# Patient Record
Sex: FEMALE | Race: BLACK OR AFRICAN AMERICAN | NOT HISPANIC OR LATINO
[De-identification: names, ages, dates, MRNs, and addresses within clinical notes are randomized per-mention and may not be internally consistent; named-entity substitution may affect disease eponyms.]

---

## 2021-10-21 ENCOUNTER — DASHBOARD ENCOUNTERS (OUTPATIENT)
Age: 66
End: 2021-10-21

## 2021-10-21 ENCOUNTER — OFFICE VISIT (OUTPATIENT)
Dept: URBAN - NONMETROPOLITAN AREA CLINIC 2 | Facility: CLINIC | Age: 66
End: 2021-10-21
Payer: MEDICARE

## 2021-10-21 ENCOUNTER — WEB ENCOUNTER (OUTPATIENT)
Dept: URBAN - NONMETROPOLITAN AREA CLINIC 2 | Facility: CLINIC | Age: 66
End: 2021-10-21

## 2021-10-21 DIAGNOSIS — Z80.0 FAMILY HISTORY OF PANCREATIC CANCER: ICD-10-CM

## 2021-10-21 DIAGNOSIS — Z12.11 SCREENING FOR MALIGNANT NEOPLASM OF COLON: ICD-10-CM

## 2021-10-21 PROCEDURE — 99203 OFFICE O/P NEW LOW 30 MIN: CPT | Performed by: INTERNAL MEDICINE

## 2021-10-21 RX ORDER — LEVOTHYROXINE SODIUM 100 UG/1
TABLET ORAL
Qty: 90 | Status: ACTIVE | COMMUNITY

## 2021-10-21 NOTE — HPI-TODAY'S VISIT:
Ms. Coty Alonso is a pleasant 65 y/o F referred to GI clinic by Dr. Sandra Chin for evaluation and management of family history of pancreatic cancer.  Her mother had pancreatic cancer 15 years ago and her brother was diagnosed this year.  She is assymptomatic.  She moves her bowels twice daily.  She runs marathons.

## 2021-10-22 LAB
A/G RATIO: 1.7
ALBUMIN: 4.9
ALKALINE PHOSPHATASE: 65
ALT (SGPT): 26
AST (SGOT): 31
BASO (ABSOLUTE): 0
BASOS: 1
BILIRUBIN, TOTAL: 0.5
BUN/CREATININE RATIO: 21
BUN: 17
CALCIUM: 10.5
CARBON DIOXIDE, TOTAL: 25
CHLORIDE: 103
CREATININE: 0.82
EGFR IF AFRICN AM: 85
EGFR IF NONAFRICN AM: 74
EOS (ABSOLUTE): 0.1
EOS: 1
GLOBULIN, TOTAL: 2.9
GLUCOSE: 76
HEMATOCRIT: 40.4
HEMATOLOGY COMMENTS:: (no result)
HEMOGLOBIN: 13.2
IMMATURE CELLS: (no result)
IMMATURE GRANS (ABS): 0
IMMATURE GRANULOCYTES: 0
LYMPHS (ABSOLUTE): 1.7
LYMPHS: 35
MCH: 30.9
MCHC: 32.7
MCV: 95
MONOCYTES(ABSOLUTE): 0.3
MONOCYTES: 6
NEUTROPHILS (ABSOLUTE): 2.8
NEUTROPHILS: 57
NRBC: (no result)
PLATELETS: 300
POTASSIUM: 4.4
PROTEIN, TOTAL: 7.8
RBC: 4.27
RDW: 12.1
SODIUM: 141
WBC: 4.9

## 2022-01-04 ENCOUNTER — TELEPHONE ENCOUNTER (OUTPATIENT)
Dept: URBAN - METROPOLITAN AREA CLINIC 92 | Facility: CLINIC | Age: 67
End: 2022-01-04